# Patient Record
Sex: MALE | Race: WHITE | NOT HISPANIC OR LATINO | URBAN - METROPOLITAN AREA
[De-identification: names, ages, dates, MRNs, and addresses within clinical notes are randomized per-mention and may not be internally consistent; named-entity substitution may affect disease eponyms.]

---

## 2019-06-18 ENCOUNTER — HOSPITAL ENCOUNTER (EMERGENCY)
Facility: HOSPITAL | Age: 54
Discharge: HOME | End: 2019-06-18
Attending: EMERGENCY MEDICINE
Payer: OTHER MISCELLANEOUS

## 2019-06-18 VITALS
BODY MASS INDEX: 35.84 KG/M2 | RESPIRATION RATE: 18 BRPM | HEIGHT: 69 IN | SYSTOLIC BLOOD PRESSURE: 150 MMHG | DIASTOLIC BLOOD PRESSURE: 79 MMHG | OXYGEN SATURATION: 95 % | TEMPERATURE: 98 F | HEART RATE: 80 BPM | WEIGHT: 242 LBS

## 2019-06-18 DIAGNOSIS — S65.516A: Primary | ICD-10-CM

## 2019-06-18 PROCEDURE — 12002 RPR S/N/AX/GEN/TRNK2.6-7.5CM: CPT

## 2019-06-18 PROCEDURE — 99283 EMERGENCY DEPT VISIT LOW MDM: CPT | Mod: 25

## 2019-06-18 PROCEDURE — 25000003 PHARM REV CODE 250: Performed by: EMERGENCY MEDICINE

## 2019-06-18 RX ORDER — LIDOCAINE HYDROCHLORIDE 10 MG/ML
10 INJECTION INFILTRATION; PERINEURAL
Status: COMPLETED | OUTPATIENT
Start: 2019-06-18 | End: 2019-06-18

## 2019-06-18 RX ORDER — ASPIRIN 81 MG/1
81 TABLET ORAL DAILY
COMMUNITY

## 2019-06-18 RX ORDER — LISINOPRIL 10 MG/1
10 TABLET ORAL DAILY
COMMUNITY

## 2019-06-18 RX ORDER — IBUPROFEN 600 MG/1
600 TABLET ORAL EVERY 6 HOURS PRN
Qty: 20 TABLET | Refills: 0 | Status: SHIPPED | OUTPATIENT
Start: 2019-06-18

## 2019-06-18 RX ADMIN — LIDOCAINE HYDROCHLORIDE 10 ML: 10 INJECTION, SOLUTION INFILTRATION; PERINEURAL at 08:06

## 2019-06-18 RX ADMIN — BACITRACIN, NEOMYCIN, POLYMYXIN B 1 EACH: 400; 3.5; 5 OINTMENT TOPICAL at 09:06

## 2019-06-18 NOTE — ED PROVIDER NOTES
Encounter Date: 6/18/2019    SCRIBE #1 NOTE: I, Onelia Kitchen, am scribing for, and in the presence of,  Jl Landaverde MD. I have scribed the following portions of the note - Other sections scribed: HPI, ROS.       History     Chief Complaint   Patient presents with    Laceration     Laceration to right 5th finger.  Cut by door.  Sent from Urgent Care for further eval - reported possible arterial bleed.     CC: Laceration    HPI: This 54 y.o male who has HTN presents to the ED for an evaluation for a laceration to the right hand.  Patient reports pain is constant and currently 6/10.  Patient reports at work, he tripped over roping cord and as he attempted to brace his fall, his hand landed onto a door frame.  Patient was evaluated at an urgent care and was advised to come to the ED to rule out a possible arterial bleed.  Patient denies nausea, emesis, abdominal pain, chest pain, shortness of breath, extremity numbness, extremity weakness, or any other associated symptoms.  No prior tx.  No alleviating factors.     The history is provided by the patient. No  was used.     Review of patient's allergies indicates:  No Known Allergies  Past Medical History:   Diagnosis Date    Hypertension      Past Surgical History:   Procedure Laterality Date    EYE SURGERY      KNEE SURGERY       History reviewed. No pertinent family history.  Social History     Tobacco Use    Smoking status: Former Smoker    Smokeless tobacco: Former User   Substance Use Topics    Alcohol use: Yes    Drug use: Never     Review of Systems   Constitutional: Negative for chills and fever.   HENT: Negative for ear pain and sore throat.    Eyes: Negative for pain.   Respiratory: Negative for cough and shortness of breath.    Cardiovascular: Negative for chest pain.   Gastrointestinal: Negative for abdominal pain, diarrhea, nausea and vomiting.   Genitourinary: Negative for dysuria.   Musculoskeletal: Negative for back pain.    Skin: Positive for wound. Negative for rash.   Neurological: Negative for headaches.       Physical Exam     Initial Vitals [06/18/19 0846]   BP Pulse Resp Temp SpO2   (!) 153/85 98 18 98.1 °F (36.7 °C) 97 %      MAP       --         Physical Exam  The patient was examined specifically for the following:   General:No significant distress, Good color, Warm and dry. Head and neck:Scalp atraumatic, Neck supple. Neurological:Appropriate conversation, Gross motor deficits. Eyes:Conjugate gaze, Clear corneas. ENT: No epistaxis. Cardiac: Regular rate and rhythm, Grossly normal heart tones. Pulmonary: Wheezing, Rales. Gastrointestinal: Abdominal tenderness, Abdominal distention. Musculoskeletal: Extremity deformity, Apparent pain with range of motion of the joints. Skin: Rash.   The findings on examination were normal except for the following:  This patient has a 4 cm laceration over the radial aspect of the little finger of the right hand.  There is obvious arterial bleeding from the wound.  There is no pain with range of motion of the PIP and the IP.  Patient has intact sensation of this finger tip.  Then to the fingers pink.  The laceration extends over the volar aspect of the proximal phalanx.   ED Course   Lac Repair  Date/Time: 6/18/2019 9:16 AM  Performed by: Jl Landaverde MD  Authorized by: Jl Landaverde MD   Consent Done: Yes  Consent given by: patient  Patient understanding: patient states understanding of the procedure being performed  Patient consent: the patient's understanding of the procedure matches consent given  Patient identity confirmed: verbally with patient  Body area: upper extremity  Location details: right small finger  Laceration length: 4 cm  Foreign bodies: no foreign bodies  Tendon involvement: none  Nerve involvement: none  Vascular damage: no  Anesthesia: local infiltration    Anesthesia:  Local Anesthetic: lidocaine 1% without epinephrine  Anesthetic total: 3 mL  Irrigation solution:  saline  Irrigation method: jet lavage  Amount of cleaning: extensive  Debridement: minimal  Degree of undermining: none  Skin closure: 4-0 Prolene  Technique: simple  Approximation difficulty: complex  Dressing: dressing applied and pressure dressing  Comments: After dressing application, the patient and the patient's finger was pink with normal capillary refill.        Labs Reviewed - No data to display       Imaging Results    None       Medical decision making:  Given the above, this patient presents to the emergency with a complex laceration of the radial aspect of the middle and proximal phalanges of the right little finger.  The volar aspect of the proximal phalanges also lacerated.  The wound edges are jagged.  There was arterial bleeding from the wound.  Wound was closed with sutures, with an inflated blood pressure cuff on the right arm.  Neurovascular and tendon function were intact after the examination. The patient's last tetanus shot was less than a year ago.  A suture repair was performed with 4 0 Prolene.                Scribe Attestation:   Scribe #1: I performed the above scribed service and the documentation accurately describes the services I performed. I attest to the accuracy of the note.    Attending Attestation:           Physician Attestation for Scribe:  Physician Attestation Statement for Scribe #1: I, Jl Landaverde MD, reviewed documentation, as scribed by Onelia Kitchen in my presence, and it is both accurate and complete.                    Clinical Impression:       ICD-10-CM ICD-9-CM   1. Laceration of blood vessel of right little finger, initial encounter S65.516A 903.5                                Jl Landaverde MD  06/18/19 0923

## 2019-06-18 NOTE — DISCHARGE INSTRUCTIONS
Please keep the wound clean and dry.  Please leave your dressing in place for 48 hr.  Return immediately if you get worse or if new problems develop.  Light use of this hand for 3 days only.  You may follow up with her primary care doctor for suture removal in 10 days.  You may also see the hand surgeon above if you have any problems.  Return especially for swelling redness tenderness or drainage from the wound.